# Patient Record
Sex: MALE | Race: WHITE
[De-identification: names, ages, dates, MRNs, and addresses within clinical notes are randomized per-mention and may not be internally consistent; named-entity substitution may affect disease eponyms.]

---

## 2020-12-04 ENCOUNTER — HOSPITAL ENCOUNTER (OUTPATIENT)
Dept: HOSPITAL 75 - PREOP | Age: 45
Discharge: HOME | End: 2020-12-04
Attending: UROLOGY
Payer: COMMERCIAL

## 2020-12-04 VITALS — WEIGHT: 187.39 LBS | HEIGHT: 64.96 IN | BODY MASS INDEX: 31.22 KG/M2

## 2020-12-04 DIAGNOSIS — N35.919: ICD-10-CM

## 2020-12-04 DIAGNOSIS — Z20.828: ICD-10-CM

## 2020-12-04 DIAGNOSIS — M89.9: ICD-10-CM

## 2020-12-04 DIAGNOSIS — Z01.812: Primary | ICD-10-CM

## 2020-12-04 PROCEDURE — 87635 SARS-COV-2 COVID-19 AMP PRB: CPT

## 2020-12-08 ENCOUNTER — HOSPITAL ENCOUNTER (OUTPATIENT)
Dept: HOSPITAL 75 - SDC | Age: 45
Discharge: HOME | End: 2020-12-08
Attending: UROLOGY
Payer: COMMERCIAL

## 2020-12-08 VITALS — DIASTOLIC BLOOD PRESSURE: 90 MMHG | SYSTOLIC BLOOD PRESSURE: 156 MMHG

## 2020-12-08 VITALS — DIASTOLIC BLOOD PRESSURE: 97 MMHG | SYSTOLIC BLOOD PRESSURE: 167 MMHG

## 2020-12-08 VITALS — SYSTOLIC BLOOD PRESSURE: 136 MMHG | DIASTOLIC BLOOD PRESSURE: 81 MMHG

## 2020-12-08 VITALS — SYSTOLIC BLOOD PRESSURE: 136 MMHG | DIASTOLIC BLOOD PRESSURE: 82 MMHG

## 2020-12-08 VITALS — SYSTOLIC BLOOD PRESSURE: 167 MMHG | DIASTOLIC BLOOD PRESSURE: 97 MMHG

## 2020-12-08 VITALS — SYSTOLIC BLOOD PRESSURE: 117 MMHG | DIASTOLIC BLOOD PRESSURE: 58 MMHG

## 2020-12-08 VITALS — SYSTOLIC BLOOD PRESSURE: 153 MMHG | DIASTOLIC BLOOD PRESSURE: 91 MMHG

## 2020-12-08 VITALS — WEIGHT: 189.6 LBS | BODY MASS INDEX: 31.59 KG/M2 | HEIGHT: 64.96 IN

## 2020-12-08 VITALS — DIASTOLIC BLOOD PRESSURE: 88 MMHG | SYSTOLIC BLOOD PRESSURE: 165 MMHG

## 2020-12-08 VITALS — DIASTOLIC BLOOD PRESSURE: 83 MMHG | SYSTOLIC BLOOD PRESSURE: 153 MMHG

## 2020-12-08 DIAGNOSIS — I10: ICD-10-CM

## 2020-12-08 DIAGNOSIS — A63.0: ICD-10-CM

## 2020-12-08 DIAGNOSIS — G47.33: ICD-10-CM

## 2020-12-08 DIAGNOSIS — N35.911: Primary | ICD-10-CM

## 2020-12-08 DIAGNOSIS — J44.9: ICD-10-CM

## 2020-12-08 DIAGNOSIS — F41.9: ICD-10-CM

## 2020-12-08 DIAGNOSIS — E66.9: ICD-10-CM

## 2020-12-08 DIAGNOSIS — F32.9: ICD-10-CM

## 2020-12-08 DIAGNOSIS — K21.9: ICD-10-CM

## 2020-12-08 DIAGNOSIS — Z88.8: ICD-10-CM

## 2020-12-08 DIAGNOSIS — B07.9: ICD-10-CM

## 2020-12-08 DIAGNOSIS — Z79.899: ICD-10-CM

## 2020-12-08 PROCEDURE — 88305 TISSUE EXAM BY PATHOLOGIST: CPT

## 2020-12-08 PROCEDURE — 87081 CULTURE SCREEN ONLY: CPT

## 2020-12-08 RX ADMIN — SODIUM CHLORIDE, SODIUM LACTATE, POTASSIUM CHLORIDE, AND CALCIUM CHLORIDE PRN MLS/HR: 600; 310; 30; 20 INJECTION, SOLUTION INTRAVENOUS at 07:55

## 2020-12-08 RX ADMIN — SODIUM CHLORIDE, SODIUM LACTATE, POTASSIUM CHLORIDE, AND CALCIUM CHLORIDE PRN MLS/HR: 600; 310; 30; 20 INJECTION, SOLUTION INTRAVENOUS at 09:17

## 2020-12-08 NOTE — PROGRESS NOTE-PRE OPERATIVE
Pre-Operative Progress Note


H&P Reviewed


The H&P was reviewed, patient examined and no changes noted.


Date Seen by Provider:  Dec 8, 2020


Time Seen by Provider:  07:06


Date H&P Reviewed:  Dec 8, 2020


Time H&P Reviewed:  07:06


Pre-Operative Diagnosis:  LARGE INGUINAL AND SMALL PENILE WARTS, AND SEVERE 

MEATAL STENOSIS











TAWIL,ELIAS A MD                Dec 8, 2020 07:07

## 2020-12-08 NOTE — ANESTHESIA-GENERAL POST-OP
General


Patient Condition


Mental Status/LOC:  Same as Preop


Cardiovascular:  Satisfactory


Nausea/Vomiting:  Absent


Respiratory:  Satisfactory


Pain:  Controlled


Complications:  Absent





Post Op Complications


Complications


None





Follow Up Care/Instructions


Patient Instructions


None needed.





Anesthesia/Patient Condition


Patient Condition


Patient is doing well, no complaints, stable vital signs, no apparent adverse 

anesthesia problems.   


No complications reported per nursing.











CHRISTINA MACHADO CRNA               Dec 8, 2020 09:46

## 2020-12-08 NOTE — PROGRESS NOTE-POST OPERATIVE
Post-Operative Progess Note


Surgeon (s)/Assistant (s)


Surgeon


ELIAS TAWIL MD


Assistant:  SAME





Pre-Operative Diagnosis


LARGE INGUINAL AND SMALL PENILE WARTS, AND SEVERE MEATAL STENOSIS





Post-Operative Diagnosis





SAME





Procedure & Operative Findings


Date of Procedure


12/8/20


Procedure Performed/Findings


EXCISION OF WARTS, MEATOTOMY, AND CYSTOSCOPY


Anesthesia Type


GENERAL





Estimated Blood Loss


Estimated blood loss (mL):  NONE





Specimens/Packing


Specimens Removed


SCROTAL AND INGUINAL WARTS


Packing:  


NONE











TAWIL,ELIAS A MD                Dec 8, 2020 07:16

## 2020-12-08 NOTE — OPERATIVE REPORT
DATE OF SERVICE:  12/08/2020



PREOPERATIVE DIAGNOSES:

1.  Severe meatal stenosis.

2.  Left scrotal wart.

3.  Large left inguinal wart.



_____POSTOPERATIVE DIAGNOSES:

1.  Severe meatal stenosis.

2.  Left scrotal wart.

3.  Large left inguinal wart.



OPERATION PERFORMED:

Meatotomy, meatoplasty, cystoscopy and excision of scrotal and inguinal warts.



SURGEON:

Elias Tawil, MD



ANESTHESIA:

General.



COMPLICATIONS:

None.



DESCRIPTION OF PROCEDURE:

Frist I went ahead and performed a meatotomy ventrally then approximated the 
mucosa

with interrupted 3-0 chromic catgut, it was wide opening of the meatus. 

Flexible cystoscope was introduced under vision.  The anterior urethra was

normal.  The prostate was nonobstructing.  Bladder neck was open.  Bladder was

inspected and was normal and clean.  No foreign body, bladder tumor or stone

visualized.  Cystoscopy was confirmed in an antegrade fashion and the cystoscope

was removed.  I excised the small wart in the left scrotal wall, cauterized the

base and then approximated the skin was interrupted 3-0 chromic catgut.  Then, I

excised the large wart at the left inguinal area, cauterized all the base to

kill any viruses and then approximated the edges with interrupted 3-0 chromic

catgut.  Needle, sponge, instrument counts were correct.  Estimated blood loss

negligible.  Neosporin plus pain was applied to the meatus and to areas of both

excision.  The patient tolerated the procedure and anesthesia well and was sent

to recovery room in stable condition.  Instructions were given to his wife.





Job ID: [id number]

DocumentID: [id number]

Dictated Date:  12/08/2020 09:29:32

Transcription Date: 12/08/2020 13:56:06

Dictated By: ELIAS TAWIL, MD

[facility]

## 2020-12-08 NOTE — NUR
TO AMB SURG FROM PAR PER CART. RATES GENITAL SURGICAL SITE PAIN 4. NO BLEEDING AT SCROTAL 
SURGICAL SITES, ICE PACK ON. PO FLUIDS AND CRACKERS PROVIDED.

## 2020-12-08 NOTE — DISCHARGE INST-UROLOGY
Discharge Inst-Urology


Reconcile Patient Problems


Problems Reviewed?:  Yes


Final Diagnosis


WARTS AND SEVERE MEATAL STENOSIS





Patient Instructions/Follow Up


Plan/Assessment/Instructions


Please make appointment to been seen in office in 4 weeks. Rest for 1 week





Neosporin+Pain ointment to meatus and excised warts areas BID for 5 days





Showers, no bath





Keep bowels soft and moving





Increase oral fluids for 48 hours and then as needed.





Diet as tolerated.





If questions or concerns contact your physician 


Or seek help at emergency department.











TAWIL,ELIAS A MD                Dec 8, 2020 07:18

## 2020-12-08 NOTE — NUR
RESTING QUIETLY, RATES GENITAL SURGICAL SITE INCISIONAL PAIN 2-3, NO BLEEDING AT SITES. 
STATES HE IS READY FOR DISMISSAL.

## 2022-05-12 ENCOUNTER — HOSPITAL ENCOUNTER (OUTPATIENT)
Dept: HOSPITAL 75 - CARD | Age: 47
End: 2022-05-12
Attending: PHYSICIAN ASSISTANT
Payer: COMMERCIAL

## 2022-05-12 DIAGNOSIS — I11.9: Primary | ICD-10-CM

## 2022-05-12 PROCEDURE — 93306 TTE W/DOPPLER COMPLETE: CPT

## 2022-05-25 ENCOUNTER — HOSPITAL ENCOUNTER (OUTPATIENT)
Dept: HOSPITAL 75 - CARD | Age: 47
End: 2022-05-25
Attending: PHYSICIAN ASSISTANT
Payer: COMMERCIAL

## 2022-05-25 VITALS — DIASTOLIC BLOOD PRESSURE: 89 MMHG | SYSTOLIC BLOOD PRESSURE: 150 MMHG

## 2022-05-25 DIAGNOSIS — I10: Primary | ICD-10-CM

## 2022-05-25 PROCEDURE — 78452 HT MUSCLE IMAGE SPECT MULT: CPT

## 2022-05-25 PROCEDURE — 93017 CV STRESS TEST TRACING ONLY: CPT

## 2022-05-25 NOTE — CARDIOLOGY STRESS TEST REPORT
Stress Test Report


Date of Procedure/Referring:


Date of Procedure:  May 25, 2022


PCP


No,Local Physician


Admitting Physician


Admitting Physician:


 








Attending Physician:


Gisela Foss





Indications:


HTN





Baseline Heart Rate:


86





Baseline Blood Pressure:


Blood Pressure Systolic:  150


Blood Pressure Diastolic:  89


Baseline Vitals





Vital Signs








  Date Time  Temp Pulse Resp B/P (MAP) Pulse Ox O2 Delivery O2 Flow Rate FiO2


 


5/25/22 13:25  82  150/89 (109) 97   











Baseline EKG:


Baseline EKG:  NSR





Summary


After explaining the procedure to the patient, he  signed a consent and then 

brought to the stress nuclear laboratory.


Patient received 0.4 mg Lexiscan for stress test, ECG, heart rate and blood 

pressure were monitored continuously.  Resting and stress dose of radio tracer 

were injected, imaging was acquired and reviewed in short axis, horizontal long 

axis and vertical long axis views.


TID:  1.07


SSS:  7


SDS:  7


EF:  52


1.  Patient tolerated Lexiscan well


2.  Reversible ischemia involving the mid to apical anterior wall, mid to apical

inferior wall


3.  Normal left ventricular size, ejection fraction 52%











MICHELINE LEVIN MD              May 25, 2022 16:25

## 2022-06-01 ENCOUNTER — HOSPITAL ENCOUNTER (OUTPATIENT)
Dept: HOSPITAL 75 - CATH | Age: 47
End: 2022-06-01
Attending: INTERNAL MEDICINE
Payer: COMMERCIAL

## 2022-06-01 VITALS — DIASTOLIC BLOOD PRESSURE: 85 MMHG | SYSTOLIC BLOOD PRESSURE: 115 MMHG

## 2022-06-01 VITALS — SYSTOLIC BLOOD PRESSURE: 170 MMHG | DIASTOLIC BLOOD PRESSURE: 98 MMHG

## 2022-06-01 VITALS — SYSTOLIC BLOOD PRESSURE: 117 MMHG | DIASTOLIC BLOOD PRESSURE: 82 MMHG

## 2022-06-01 VITALS — SYSTOLIC BLOOD PRESSURE: 118 MMHG | DIASTOLIC BLOOD PRESSURE: 89 MMHG

## 2022-06-01 VITALS — WEIGHT: 199.96 LBS | BODY MASS INDEX: 33.31 KG/M2 | HEIGHT: 65 IN

## 2022-06-01 VITALS — SYSTOLIC BLOOD PRESSURE: 114 MMHG | DIASTOLIC BLOOD PRESSURE: 79 MMHG

## 2022-06-01 VITALS — SYSTOLIC BLOOD PRESSURE: 132 MMHG | DIASTOLIC BLOOD PRESSURE: 71 MMHG

## 2022-06-01 VITALS — SYSTOLIC BLOOD PRESSURE: 129 MMHG | DIASTOLIC BLOOD PRESSURE: 82 MMHG

## 2022-06-01 VITALS — DIASTOLIC BLOOD PRESSURE: 71 MMHG | SYSTOLIC BLOOD PRESSURE: 123 MMHG

## 2022-06-01 VITALS — SYSTOLIC BLOOD PRESSURE: 123 MMHG | DIASTOLIC BLOOD PRESSURE: 71 MMHG

## 2022-06-01 DIAGNOSIS — I10: ICD-10-CM

## 2022-06-01 DIAGNOSIS — I25.10: Primary | ICD-10-CM

## 2022-06-01 DIAGNOSIS — E78.5: ICD-10-CM

## 2022-06-01 DIAGNOSIS — Z79.899: ICD-10-CM

## 2022-06-01 DIAGNOSIS — F17.210: ICD-10-CM

## 2022-06-01 LAB
ALBUMIN SERPL-MCNC: 4.1 GM/DL (ref 3.2–4.5)
ALP SERPL-CCNC: 73 U/L (ref 40–136)
ALT SERPL-CCNC: 25 U/L (ref 0–55)
APTT BLD: 25 SEC (ref 24–35)
APTT PPP: YELLOW S
BACTERIA #/AREA URNS HPF: NEGATIVE /HPF
BILIRUB SERPL-MCNC: 0.4 MG/DL (ref 0.1–1)
BILIRUB UR QL STRIP: NEGATIVE
BUN/CREAT SERPL: 10
CALCIUM SERPL-MCNC: 9.2 MG/DL (ref 8.5–10.1)
CHLORIDE SERPL-SCNC: 106 MMOL/L (ref 98–107)
CHOLEST SERPL-MCNC: 174 MG/DL (ref ?–200)
CO2 SERPL-SCNC: 22 MMOL/L (ref 21–32)
CREAT SERPL-MCNC: 0.78 MG/DL (ref 0.6–1.3)
FIBRINOGEN PPP-MCNC: CLEAR MG/DL
GFR SERPLBLD BASED ON 1.73 SQ M-ARVRAT: 109 ML/MIN
GLUCOSE SERPL-MCNC: 196 MG/DL (ref 70–105)
GLUCOSE UR STRIP-MCNC: NEGATIVE MG/DL
HCT VFR BLD CALC: 45 % (ref 40–54)
HDLC SERPL-MCNC: 27 MG/DL (ref 40–60)
HGB BLD-MCNC: 15.4 G/DL (ref 13.3–17.7)
INR PPP: 0.9 (ref 0.8–1.4)
KETONES UR QL STRIP: NEGATIVE
LEUKOCYTE ESTERASE UR QL STRIP: NEGATIVE
MCH RBC QN AUTO: 30 PG (ref 25–34)
MCHC RBC AUTO-ENTMCNC: 34 G/DL (ref 32–36)
MCV RBC AUTO: 89 FL (ref 80–99)
NITRITE UR QL STRIP: NEGATIVE
OTHER ELEMENTS URNS MICRO: (no result) /HPF
PH UR STRIP: 6 [PH] (ref 5–9)
PLATELET # BLD: 200 10^3/UL (ref 130–400)
PMV BLD AUTO: 10 FL (ref 9–12.2)
POTASSIUM SERPL-SCNC: 3.8 MMOL/L (ref 3.6–5)
PROT SERPL-MCNC: 7 GM/DL (ref 6.4–8.2)
PROT UR QL STRIP: (no result)
PROTHROMBIN TIME: 12.9 SEC (ref 12.2–14.7)
RBC #/AREA URNS HPF: (no result) /HPF
SODIUM SERPL-SCNC: 142 MMOL/L (ref 135–145)
SP GR UR STRIP: >=1.03 (ref 1.02–1.02)
SQUAMOUS #/AREA URNS HPF: (no result) /HPF
TRIGL SERPL-MCNC: 353 MG/DL (ref ?–150)
VLDLC SERPL CALC-MCNC: 71 MG/DL (ref 5–40)
WBC # BLD AUTO: 8.5 10^3/UL (ref 4.3–11)
WBC #/AREA URNS HPF: (no result) /HPF

## 2022-06-01 PROCEDURE — 85027 COMPLETE CBC AUTOMATED: CPT

## 2022-06-01 PROCEDURE — 85730 THROMBOPLASTIN TIME PARTIAL: CPT

## 2022-06-01 PROCEDURE — 81000 URINALYSIS NONAUTO W/SCOPE: CPT

## 2022-06-01 PROCEDURE — 71045 X-RAY EXAM CHEST 1 VIEW: CPT

## 2022-06-01 PROCEDURE — 93458 L HRT ARTERY/VENTRICLE ANGIO: CPT

## 2022-06-01 PROCEDURE — 93005 ELECTROCARDIOGRAM TRACING: CPT

## 2022-06-01 PROCEDURE — 85610 PROTHROMBIN TIME: CPT

## 2022-06-01 PROCEDURE — 80061 LIPID PANEL: CPT

## 2022-06-01 PROCEDURE — 80053 COMPREHEN METABOLIC PANEL: CPT

## 2022-06-01 PROCEDURE — 36415 COLL VENOUS BLD VENIPUNCTURE: CPT

## 2022-06-01 PROCEDURE — 87081 CULTURE SCREEN ONLY: CPT

## 2022-06-01 NOTE — DISCHARGE INST-POST CATH
Discharge Inst-CATH/EP


Problems Reviewed?:  Yes


Post Cardiac Cath/EP D/C Inst


Follow Up/Plan


Appointment with Dr. Chambers's office in 2 to 4 weeks


<b>CARDIAC CATH/EP PROCEDURE DISCHARGE INSTRUCTIONS</b>





ACTIVITY





* Go Home directly and rest.


* Limit activity of the leg (or wrist if it was used) for 7 days including aer

obics, swimming,


   jogging, bicycling, etc.


* Restrict stair-climbing for 7 days if possible, if not, climb up with your 

non-cath leg, then


   bring together on the same step.


* Avoid lifting, pushing, pulling or excessive movement of the affected extremi

ty for 7 days.


* Customary sexual activity may be resumed after 2 days-use caution not to use a

position  


   that strains or causes pain to the affected extremity.


* No driving for 24 hours.


* NO SMOKING. 


* Avoid straining for bowel movements for 7 days.


* Gentle walking on level ground is allowed.


* Returning to work will depend on the type of procedure and the results. Your 

doctor will discuss


   this with you.





CALL YOUR DOCTOR FOR ANY OF THE FOLLOWING:





*If bleeding from the puncture site occurs- Apply gentle pressure to site with 

clean cloth and call


   your doctor or EMS.


* If a knot or lump forms under the skin, increases in size, or causes pain.


* If bruising appears to be worsening or moving further down your leg instead of

disappearing.


* Temperature above 101 F.





CARE OF YOUR GROIN INCISION;





* Bruising or purple discoloration of the skin near the puncture site is common.


* You may shower only, no bathtub bathing for 5 days.  Be careful to avoid 

slipping as your


   leg may feel stiff.


* If a closure device was used on your femoral artery, please see the attached 

guide regarding


   care of the device and your leg.


* Leave dressing on FOR 24 hours.





CARE OF YOUR WRIST INCISION;





* Bruising or purple discoloration of the skin near the puncture site is common.


* You may shower.


* DO NOT submerge wrist.


* Leave dressing on FOR 24 hours.











MICHELINE CHAMBERS MD               Jun 1, 2022 11:13

## 2022-06-01 NOTE — CARDIAC CATH REPORT
Cardiac Cath Report


Physician (s)/Assistant (s)


Physician


MICHELINE LEVIN MD





Pre-Procedure Diagnosis


Pre-Procedure Diagnosis:  Coronary artery disease





Post-Procedure Note


Procedure Start Date:  Jun 1, 2022


Name of Procedure:  


Left heart catheterization


Findings/Procedure Note


PROCEDURE NOTE:


47 years old gentleman with history of hypertension, hyperlipidemia, has been 

having recurrent chest pain, had an abnormal stress test, scheduled for cardiac 

catheterization possible PTCA.


After explaining the procedure to the patient, all pros and cons were explained,

all questions were answered.  The patient signed the consent and then he  was 

placed on the cardiac catheterization laboratory. Groin was prepped SL fashion 

local anesthesia was used. Sheath placed in the right radial artery, [place] 

catheter was advanced to the left ventricular cavity, pressure was measured, 

pullback LV to aorta was done, engaged the right and left coronary system, 

multiple views were obtained.  


At the end of the procedure the sheath was removed.  Vascular band was used





FINDINGS:





Hemodynamics 


/8, end-diastolic pressure of 8


Aorta 122/80 mean of 96





ANATOMY:


Left Main is free of obstructive disease


Left Anterior Descending has mild disease nonobstructive disease


Left Circumflex has mild disease nonobstructive disease


Right Coronary Artery has mild diffuse ectasia, mild to moderate disease in the 

midportion nonobstructive disease


LV Gram was not done, pressure was measured





CONCLUSION:


1.  Mild coronary ectasia, mild to moderate disease in the mid right coronary 

artery, dominant artery, nonobstructive disease


2.  Otherwise mild coronary artery disease nonobstructive disease


3.  Normal left ventricular end-diastolic pressure





DISCUSSION AND RECOMMENDATION:


Medical therapy is recommended no intervention is warranted


Anesthesia Type:  Conscious Sedation


Estimated blood loss (mL):  10 ml


Contrast Amount:  50 ml


Total Radiation Dose:  364 mGy





Post-Procedure Diagnosis


Post-operative diagnosis:  


1.  Chest pain


Coronary artery disease


Hypertension


Hyperlipidemia











MICHELINE LEVIN MD               Jun 1, 2022 11:05

## 2022-06-01 NOTE — DIAGNOSTIC IMAGING REPORT
INDICATION: Abnormal stress test



AP view of the chest is obtained.



COMPARISON: No previous study is available for comparison at this

time.



FINDINGS: Heart size and pulmonary vasculature are within normal

limits, and the lungs are clear, bilaterally.  



IMPRESSION: Unremarkable chest.   



Dictated by: 



  Dictated on workstation # [id number]

## 2022-06-01 NOTE — CONSCIOUS SEDATION/ASA
Conscious Sedation Pre-Proced


Time


11:02





ASA Score


3


For ASA 3 and 4: Consider anesthesia and medical clearance. Also, for patients

with a history of failed moderate sedation consider anesthesia.

















Airway 


 


Lungs 


 


Heart 


 


 ASA score


 


 ASA 1: a normal healthy patient


 


 ASA 2:  a patient with a mild systemic disease (mid diabetes, controlled 

hypertension, obesity 


 


x ASA 3:  a patient with a severe systemic disease that limits activity  

(angina, COPD, prior Myocardial infarction)


 


 ASA 4:  a patient with an incapacitating disease that is a constant threat to 

life (CHF, renal failure)


 


 ASA 5:  a moribund patient not expected to survive 24 hrs.  (ruptured aneurysm)


 


 ASA 6:  a declared brain-dead patient whose organs are being harvested.


 


 For emergent operations, add the letter E after the classification











Mallampati Classification


Grade 3





Sedation Plan


Analgesia, Amnesia, Plan communicated to team members, Discussed options with 

patient/fam, Discussed risks with patient/fam


The patient is an appropriate candidate to undergo the planned procedure, 

sedation, and anesthesia.





The patient immediately re-assessed prior to indication.











MICHELINE LEVIN MD               Jun 1, 2022 11:02

## 2023-07-17 ENCOUNTER — HOSPITAL ENCOUNTER (OUTPATIENT)
Dept: HOSPITAL 75 - RAD | Age: 48
End: 2023-07-17
Attending: NURSE PRACTITIONER
Payer: COMMERCIAL

## 2023-07-17 DIAGNOSIS — I73.9: Primary | ICD-10-CM

## 2023-07-17 DIAGNOSIS — I10: ICD-10-CM

## 2023-07-17 PROCEDURE — 93923 UPR/LXTR ART STDY 3+ LVLS: CPT

## 2023-07-17 NOTE — DIAGNOSTIC IMAGING REPORT
INDICATION: Peripheral vascular disease



Ankle-brachial indices.



Blood pressure recorded in the brachial arteries and in the

thigh, calf and in the posterior tibial and dorsalis pedis

regions of the ankle. 



The patient has very low blood pressures in both legs with ankle

brachial index on the right of 0.37 at the level of the ankle and

on the left at 0.1 at the level of the ankle.



IMPRESSION: Critically low ankle brachial indices suggesting

severe peripheral vascular disease.



Dictated by: 



  Dictated on workstation # [id number]

## 2023-07-26 ENCOUNTER — HOSPITAL ENCOUNTER (OUTPATIENT)
Dept: HOSPITAL 75 - CATH | Age: 48
LOS: 1 days | Discharge: HOME | End: 2023-07-27
Attending: INTERNAL MEDICINE
Payer: COMMERCIAL

## 2023-07-26 VITALS — SYSTOLIC BLOOD PRESSURE: 121 MMHG | DIASTOLIC BLOOD PRESSURE: 72 MMHG

## 2023-07-26 VITALS — SYSTOLIC BLOOD PRESSURE: 131 MMHG | DIASTOLIC BLOOD PRESSURE: 79 MMHG

## 2023-07-26 VITALS — SYSTOLIC BLOOD PRESSURE: 146 MMHG | DIASTOLIC BLOOD PRESSURE: 83 MMHG

## 2023-07-26 VITALS — DIASTOLIC BLOOD PRESSURE: 89 MMHG | SYSTOLIC BLOOD PRESSURE: 141 MMHG

## 2023-07-26 VITALS — SYSTOLIC BLOOD PRESSURE: 138 MMHG | DIASTOLIC BLOOD PRESSURE: 90 MMHG

## 2023-07-26 VITALS — SYSTOLIC BLOOD PRESSURE: 117 MMHG | DIASTOLIC BLOOD PRESSURE: 87 MMHG

## 2023-07-26 VITALS — DIASTOLIC BLOOD PRESSURE: 82 MMHG | SYSTOLIC BLOOD PRESSURE: 121 MMHG

## 2023-07-26 VITALS — DIASTOLIC BLOOD PRESSURE: 90 MMHG | SYSTOLIC BLOOD PRESSURE: 133 MMHG

## 2023-07-26 VITALS — DIASTOLIC BLOOD PRESSURE: 85 MMHG | SYSTOLIC BLOOD PRESSURE: 137 MMHG

## 2023-07-26 VITALS — WEIGHT: 187.61 LBS | HEIGHT: 64.96 IN | BODY MASS INDEX: 31.26 KG/M2

## 2023-07-26 VITALS — DIASTOLIC BLOOD PRESSURE: 76 MMHG | SYSTOLIC BLOOD PRESSURE: 127 MMHG

## 2023-07-26 VITALS — DIASTOLIC BLOOD PRESSURE: 75 MMHG | SYSTOLIC BLOOD PRESSURE: 115 MMHG

## 2023-07-26 DIAGNOSIS — I70.211: ICD-10-CM

## 2023-07-26 DIAGNOSIS — I10: ICD-10-CM

## 2023-07-26 DIAGNOSIS — Z82.49: ICD-10-CM

## 2023-07-26 DIAGNOSIS — F17.210: ICD-10-CM

## 2023-07-26 DIAGNOSIS — E78.5: ICD-10-CM

## 2023-07-26 DIAGNOSIS — I25.10: ICD-10-CM

## 2023-07-26 DIAGNOSIS — I70.222: Primary | ICD-10-CM

## 2023-07-26 DIAGNOSIS — Z79.899: ICD-10-CM

## 2023-07-26 LAB
ALBUMIN SERPL-MCNC: 4.4 GM/DL (ref 3.2–4.5)
ALP SERPL-CCNC: 86 U/L (ref 40–136)
ALT SERPL-CCNC: 13 U/L (ref 0–55)
AMORPH SED URNS QL MICRO: (no result) /LPF
APTT BLD: 25 SEC (ref 24–35)
APTT PPP: YELLOW S
BACTERIA #/AREA URNS HPF: (no result) /HPF
BILIRUB SERPL-MCNC: 0.5 MG/DL (ref 0.1–1)
BILIRUB UR QL STRIP: (no result)
BUN/CREAT SERPL: 12
CALCIUM SERPL-MCNC: 9.9 MG/DL (ref 8.5–10.1)
CHLORIDE SERPL-SCNC: 104 MMOL/L (ref 98–107)
CHOLEST SERPL-MCNC: 267 MG/DL (ref ?–200)
CO2 SERPL-SCNC: 23 MMOL/L (ref 21–32)
CREAT SERPL-MCNC: 0.92 MG/DL (ref 0.6–1.3)
FIBRINOGEN PPP-MCNC: CLEAR MG/DL
GFR SERPLBLD BASED ON 1.73 SQ M-ARVRAT: 101 ML/MIN
GLUCOSE SERPL-MCNC: 234 MG/DL (ref 70–105)
GLUCOSE UR STRIP-MCNC: NEGATIVE MG/DL
HCT VFR BLD CALC: 48 % (ref 40–54)
HDLC SERPL-MCNC: 25 MG/DL (ref 40–60)
HGB BLD-MCNC: 16.5 G/DL (ref 13.3–17.7)
INR PPP: 1 (ref 0.8–1.4)
KETONES UR QL STRIP: NEGATIVE
LEUKOCYTE ESTERASE UR QL STRIP: NEGATIVE
MCH RBC QN AUTO: 30 PG (ref 25–34)
MCHC RBC AUTO-ENTMCNC: 34 G/DL (ref 32–36)
MCV RBC AUTO: 88 FL (ref 80–99)
NITRITE UR QL STRIP: NEGATIVE
PH UR STRIP: 6 [PH] (ref 5–9)
PLATELET # BLD: 243 10^3/UL (ref 130–400)
PMV BLD AUTO: 10.1 FL (ref 9–12.2)
POTASSIUM SERPL-SCNC: 4.1 MMOL/L (ref 3.6–5)
PROT SERPL-MCNC: 7.8 GM/DL (ref 6.4–8.2)
PROT UR QL STRIP: (no result)
PROTHROMBIN TIME: 12.9 SEC (ref 12.2–14.7)
RBC #/AREA URNS HPF: (no result) /HPF
SODIUM SERPL-SCNC: 137 MMOL/L (ref 135–145)
SP GR UR STRIP: >=1.03 (ref 1.02–1.02)
SQUAMOUS #/AREA URNS HPF: (no result) /HPF
TRIGL SERPL-MCNC: 609 MG/DL (ref ?–150)
VLDLC SERPL CALC-MCNC: (no result) MG/DL (ref 5–40)
WBC # BLD AUTO: 10.2 10^3/UL (ref 4.3–11)
WBC #/AREA URNS HPF: (no result) /HPF

## 2023-07-26 PROCEDURE — 87081 CULTURE SCREEN ONLY: CPT

## 2023-07-26 PROCEDURE — 85027 COMPLETE CBC AUTOMATED: CPT

## 2023-07-26 PROCEDURE — 93005 ELECTROCARDIOGRAM TRACING: CPT

## 2023-07-26 PROCEDURE — 75635 CT ANGIO ABDOMINAL ARTERIES: CPT

## 2023-07-26 PROCEDURE — 81000 URINALYSIS NONAUTO W/SCOPE: CPT

## 2023-07-26 PROCEDURE — 80061 LIPID PANEL: CPT

## 2023-07-26 PROCEDURE — 36415 COLL VENOUS BLD VENIPUNCTURE: CPT

## 2023-07-26 PROCEDURE — 85730 THROMBOPLASTIN TIME PARTIAL: CPT

## 2023-07-26 PROCEDURE — 80053 COMPREHEN METABOLIC PANEL: CPT

## 2023-07-26 PROCEDURE — 75710 ARTERY X-RAYS ARM/LEG: CPT

## 2023-07-26 PROCEDURE — 85610 PROTHROMBIN TIME: CPT

## 2023-07-26 PROCEDURE — 71045 X-RAY EXAM CHEST 1 VIEW: CPT

## 2023-07-26 RX ADMIN — SODIUM CHLORIDE SCH MLS/HR: 900 INJECTION, SOLUTION INTRAVENOUS at 15:50

## 2023-07-26 RX ADMIN — ENOXAPARIN SODIUM SCH MG: 100 INJECTION SUBCUTANEOUS at 18:15

## 2023-07-26 NOTE — CARDIAC PROCEDURE NOTE-CS/ASA
Pre-Procedure Note


Pre-Op Procedure Note


Date of Available H&P:  Jul 20, 2023


Date H&P Reviewed:  Jul 26, 2023


Time H&P Reviewed:  13:09


History & Physical:  H&P Reviewed, Patient Examed, No changes noted


Pre-Operative Diagnosis:  Coronary artery disease





Moderate Sedation PreProcedure


Time


13:09





ASA Score


3














Airway 


 


Lungs 


 


Heart 


 


 ASA score


 


 ASA 1: a normal healthy patient


 


 ASA 2:  a patient with a mild systemic disease (mid diabetes, controlled 

hypertension, obesity 


 


 ASA 3:  a patient with a severe systemic disease that limits activity  (angina,

COPD, prior Myocardial infarction)


 


 ASA 4:  a patient with an incapacitating disease that is a constant threat to 

life (CHF, renal failure)


 


 ASA 5:  a moribund patient not expected to survive 24 hrs.  (ruptured aneurysm)


 


 ASA 6:  a declared brain-dead patient whose organs are being harvested.


 


 For emergent operations, add the letter E after the classification











Mallampati Classification


Grade 3





Sedation Plan


Analgesia, Amnesia, Plan communicated to team members, Discussed options with 

patient/fam, Discussed risks with patient/fam


The patient is an appropriate candidate to undergo the planned procedure, 

sedation, and anesthesia.





The patient immediately re-assessed prior to indication.











MICHELINE LEVIN MD              Jul 26, 2023 13:09

## 2023-07-26 NOTE — DIAGNOSTIC IMAGING REPORT
EXAMINATION:

Abnormal ankle-brachial indices. 



FINDINGS:

The heart size and configuration are normal. The thoracic aortic

contour appears normal. No appreciable thoracic aortic

calcifications. The lungs are clear. No failure, effusion, or

pneumothorax. 



IMPRESSION: 

Unremarkable frontal chest.



Dictated by: 



  Dictated on workstation # [id number]

## 2023-07-26 NOTE — DIAGNOSTIC IMAGING REPORT
EXAMINATION: 

CT angiography aorta and lower extremity with runoffs.



TECHNIQUE: 

Multiple contiguous axial images were obtained through the

abdomen , pelvis and lower extremities after administration of

intravenous contrast. 3D MIP reconstructed CTA acquisition were

then performed. All CT scans use one or more of the following

dose optimizing techniques: automated exposure control, MA and/or

KvP adjustment based on a patient size and exam type, or

iterative reconstruction. 



HISTORY: 

PAD.



COMPARISON: 

None available.



FINDINGS: 



Vascular findings:



Abdominal aorta: Occluded below the renal arteries.

Celiac artery: No stenosis.

Superior mesenteric artery: No stenosis.

Right renal artery: No stenosis.

Left renal artery: No stenosis.

Inferior mesenterica artery: Occluded proximally with

reconstitution via collaterals from the superior mesenteric

artery.



Right common iliac artery: Occluded.

Right external iliac artery: Reconstituted without stenosis.

Right common femoral artery: No stenosis.

Right superficial femoral artery: No stenosis.

Right deep femoral artery: No stenosis.

Right popliteal artery: No stenosis.

Right posterior tibial artery: No stenosis.

Right anterior tibial artery: No stenosis.

Right peroneal artery: No stenosis.

Right ankle and foot vessels: Two vessel runoff.



Left common iliac artery: Occluded.

Left external iliac artery: Occluded.

Left common femoral artery: Occluded.

Left superficial femoral artery: Occluded through the proximal

and mid portions with reconstitution distally.

Left deep femoral artery: Occluded proximally with reconstitution

in the midportion.

Left popliteal artery: No stenosis.

Left posterior tibial artery: No stenosis.

Left anterior tibial artery: No stenosis.

Left peroneal artery: Occluded in the distal portion.

Left ankle and foot vessels: Two vessel runoff.



Other findings:

Limited views of the lower thorax are unremarkable.



There are small cysts in the liver. No suspicious liver lesion.

There is no biliary ductal dilation. Gallbladder is normal. 

Pancreas is normal.  Spleen is normal. Adrenal glands are normal.



There is a 1.5 x 1.4 cm nodule in the lower pole of the right

kidney that appears to show contrast enhancement, concerning for

renal cell carcinoma. There is no hydronephrosis. Urinary bladder

is normal.



Bowel is normal in caliber without obstruction or inflammation.

There has been a rectosigmoid resection. There is a

fat-containing ventral abdominal hernia. No free fluid or air. No

abdominal or pelvic lymphadenopathy. 



There are no suspicious osseus lesions. There is moderate

anterolisthesis of L5 on S1.



IMPRESSION:

1. Occlusion of the infrarenal aorta and both common iliac

arteries.



2. The right common and internal iliac arteries are reconstituted

supplying blood flow to the right lower extremity.



3. The left internal iliac, external iliac, left common femoral,

superficial femoral, and deep femoral arteries are occluded with

distal reconstitution of the superficial and deep femoral

arteries via collateral vessels.



4. Highly suspicious 1.5 cm nodule in the lower pole of the right

kidney, concerning for renal cell carcinoma. Renal protocol CT or

MRI is recommended.



5. The report was faxed to the office of Dr. Chambers's office as it

was closed at 4:56 PM.



Dictated by: 



  Dictated on workstation # [id number]

## 2023-07-27 VITALS — SYSTOLIC BLOOD PRESSURE: 146 MMHG | DIASTOLIC BLOOD PRESSURE: 110 MMHG

## 2023-07-27 VITALS — DIASTOLIC BLOOD PRESSURE: 83 MMHG | SYSTOLIC BLOOD PRESSURE: 127 MMHG

## 2023-07-27 VITALS — SYSTOLIC BLOOD PRESSURE: 155 MMHG | DIASTOLIC BLOOD PRESSURE: 99 MMHG

## 2023-07-27 VITALS — SYSTOLIC BLOOD PRESSURE: 151 MMHG | DIASTOLIC BLOOD PRESSURE: 83 MMHG

## 2023-07-27 RX ADMIN — ENOXAPARIN SODIUM SCH MG: 100 INJECTION SUBCUTANEOUS at 05:40

## 2023-07-27 RX ADMIN — SODIUM CHLORIDE SCH MLS/HR: 900 INJECTION, SOLUTION INTRAVENOUS at 10:11

## 2023-07-27 RX ADMIN — SODIUM CHLORIDE SCH MLS/HR: 900 INJECTION, SOLUTION INTRAVENOUS at 00:03

## 2023-07-27 NOTE — CARDIOLOGY PROGRESS NOTE
Subjective


Date Seen by Provider:  Jul 27, 2023


Time Seen by Provider:  08:59


Subjective/Events-last exam


Patient was seen at bedside, sitting comfortably, feeling well.





Objective-Cardiology


Exam


Last Set of Vital Signs





Vital Signs








 7/27/23





 07:38


 


Temp 36.1


 


Pulse 96


 


Resp 10


 


B/P (MAP) 151/83 (105)


 


Pulse Ox 98


 


O2 Delivery Room Air








I&O











Intake and Output 


 


 7/27/23





 00:00


 


Intake Total 500 ml


 


Balance 500 ml


 


 


 


Intake Oral 500 ml


 


# Voids 2








General:  Alert, Oriented X3, Cooperative


HEENT:  Atraumatic, PERRLA


Neck:  Supple, No JVD, No Thyromegaly


Lungs:  Clear to Auscultation, Normal Air Movement


Heart:  Regular Rate, Normal S1, Normal S2, No Murmurs


Abdomen:  Normal Bowel Sounds, Soft, No Tenderness, No Hepatosplenomegaly, No 

Masses


Extremities:  No Clubbing, No Cyanosis, No Edema, No Tenderness/Swelling, Other 

(Diminished pulses)


Skin:  No Rashes, No Breakdown, No Significant Lesion


Neuro:  Normal Gait, Normal Speech, Strength at 5/5 X4 Ext, Normal Tone, 

Sensation Intact


Psych/Mental Status:  Mental Status NL, Mood NL





Results


Lab


Laboratory Tests


7/26/23 11:05














A/P-Cardiology


Admission Diagnosis


Claudication


Peripheral arterial disease


Hypertension


Hyperlipidemia





Assessment/Plan


Claudication, severe peripheral arterial disease


CT angiogram was done showing total occlusion from the infrarenal abdominal 

aorta down in the right common iliac and down in the left leg to the popliteal.


I attempted peripheral angiogram was able to access the right femoral artery and

there was total occlusion there was good flow slow flow in the SFA and popliteal

arteries


Referred him to , I discussed the management plan with the vascular surgeon at

 offered appointment as an outpatient.


Dr Park





Coronary artery disease, cardiac catheterization done in October 2022 showing 

nonobstructive disease





Tobaccoism, educated in length about smoking cessation





Hypertension, monitor blood pressure





Hyperlipidemia, started on Lipitor 80 mg daily











MICHELINE LEVNI MD              Jul 27, 2023 09:01

## 2023-07-27 NOTE — DISCHARGE INST-POST CATH
Discharge Inst-CATH/EP


Problems Reviewed?:  Yes


Post Cardiac Cath/EP D/C Inst


Follow Up/Plan


Appointment with , vascular surgery at  [phone]


Appointment with Dr. Chambers's office in 2 to 4 weeks


<b>CARDIAC CATH/EP PROCEDURE DISCHARGE INSTRUCTIONS</b>





ACTIVITY





* Go Home directly and rest.


* Limit activity of the leg (or wrist if it was used) for 7 days including 

aerobics, swimming,


   jogging, bicycling, etc.


* Restrict stair-climbing for 7 days if possible, if not, climb up with your 

non-cath leg, then


   bring together on the same step.


* Avoid lifting, pushing, pulling or excessive movement of the affected 

extremity for 7 days.


* Customary sexual activity may be resumed after 2 days-use caution not to use a

position  


   that strains or causes pain to the affected extremity.


* No driving for 24 hours.


* NO SMOKING. 


* Avoid straining for bowel movements for 7 days.


* Gentle walking on level ground is allowed.


* Returning to work will depend on the type of procedure and the results. Your 

doctor will discuss


   this with you.





CALL YOUR DOCTOR FOR ANY OF THE FOLLOWING:





*If bleeding from the puncture site occurs- Apply gentle pressure to site with 

clean cloth and call


   your doctor or EMS.


* If a knot or lump forms under the skin, increases in size, or causes pain.


* If bruising appears to be worsening or moving further down your leg instead of

disappearing.


* Temperature above 101 F.





CARE OF YOUR GROIN INCISION;





* Bruising or purple discoloration of the skin near the puncture site is common.


* You may shower only, no bathtub bathing for 5 days.  Be careful to avoid 

slipping as your


   leg may feel stiff.


* If a closure device was used on your femoral artery, please see the attached 

guide regarding


   care of the device and your leg.


* Leave dressing on FOR 24 hours.





CARE OF YOUR WRIST INCISION;





* Bruising or purple discoloration of the skin near the puncture site is common.


* You may shower.


* DO NOT submerge wrist.


* Leave dressing on FOR 24 hours.











MICHELINE CHAMBERS MD              Jul 27, 2023 09:04